# Patient Record
Sex: MALE | Race: WHITE | NOT HISPANIC OR LATINO
[De-identification: names, ages, dates, MRNs, and addresses within clinical notes are randomized per-mention and may not be internally consistent; named-entity substitution may affect disease eponyms.]

---

## 2021-01-13 ENCOUNTER — APPOINTMENT (OUTPATIENT)
Dept: ORTHOPEDIC SURGERY | Facility: CLINIC | Age: 81
End: 2021-01-13
Payer: MEDICARE

## 2021-01-13 DIAGNOSIS — Z86.39 PERSONAL HISTORY OF OTHER ENDOCRINE, NUTRITIONAL AND METABOLIC DISEASE: ICD-10-CM

## 2021-01-13 PROBLEM — Z00.00 ENCOUNTER FOR PREVENTIVE HEALTH EXAMINATION: Status: ACTIVE | Noted: 2021-01-13

## 2021-01-13 PROCEDURE — 99204 OFFICE O/P NEW MOD 45 MIN: CPT | Mod: 25

## 2021-01-13 PROCEDURE — 99072 ADDL SUPL MATRL&STAF TM PHE: CPT

## 2021-01-13 PROCEDURE — 73562 X-RAY EXAM OF KNEE 3: CPT | Mod: 50

## 2021-01-13 PROCEDURE — 20610 DRAIN/INJ JOINT/BURSA W/O US: CPT | Mod: RT

## 2021-01-13 NOTE — DISCUSSION/SUMMARY
[de-identified] : I discussed the underlying pathophysiology of the patient's condition in great detail with the patient. I went over the patient's x-rays with them in great detail. The extent of the patient’s arthritis was discussed in great detail with them. We discussed the use of ice, Tylenol and anti-inflammatories to relieve pain. Various treatment modalities including cortisone injections, viscosupplementation, and surgical intervention were discussed as solutions for the patient's symptoms. Patient understands he needs to consider knee replacement given he is losing motion and conservative measures are not providing enough relief. The proper pre and post operative procedures and expectations were discussed in extensive detail with the patient. We had a lengthy discussion about the patient's issues, and talked about the benefits and risks of the procedure. He is interested in scheduling surgery for March or April.\par The patient elected to have his right knee aspirated followed by a cortisone injection today, and tolerated it well. I instructed the patient on ROM exercises, and told them to take it easy. The use of ice and rest was reviewed with the patient. The patient may resume activities tomorrow. He understands that this injection will delay a right knee surgery by 3 months. All of his questions were answered. He understands and consents to the plan. 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 Based upon the patient's continued symptoms and failure to respond to conservative treatment I have recommended a total knee replacement for this patient. A long discussion took place with the patient describing what a total joint replacement is and what the procedure would entail. A total knee model, similar to the implant that will be used during the operation was utilized to demonstrate and to discuss the various bearing surfaces of the implants. The hospitalization and post-operative care and rehabilitation were also discussed. The use of perioperative antibiotics and DVT prophylaxis were discussed. The risk, benefits and alternatives to a surgical intervention were discussed at length with the patient. The patient was also advised of risks related to the medical comorbidities and elevated body mass index (BMI). A lengthy discussion took place to review the most common complications including but not limited to: deep vein thrombosis, pulmonary embolus, heart attack, stroke, infection, wound breakdown, numbness, damage to nerves, tendon, muscles, arteries or other blood vessels, death and other possible complications from anesthesia. The patient was told that we will take steps to minimize these risks by using sterile technique, antibiotics and DVT prophylaxis when appropriate and follow the patient postoperatively in the office setting to monitor progress. The possibility of recurrent pain, no improvement in pain and actual worsening of pain were also discussed with the patient.\par \par The discharge plan of care focused on the patient going home following surgery. I encouraged the patient to make the necessary arrangements to have someone stay with them when they are discharged home. Following discharge, a home care nurse will visit the patient. They will open your home care case and request home physical therapy services. Home physical therapy will commence following discharge provided it is appropriate and covered by his health insurance benefit plan.\par \par The risks, benefits and alternative treatment options of total joint replacement were reviewed with the patient at great length. All questions were answered to the satisfaction of the patient. The patient participated in an interactive discussion of the total knee replacement implant planned for their surgery with questions answered. The patient agreed with the treatment plan, and has decided to move forward with the elective total knee replacement as planned.\par \par CESAR PEPPER was seen face to face and needs a commode for bedside use as the patient has no ability to acces the bathroom in their home. The patient also requires a rolling walker as this is needed for activities of daily living within their home secondary to the diagnosis of osteoarthritis.

## 2021-01-13 NOTE — HISTORY OF PRESENT ILLNESS
[de-identified] : 81 year old male presents complaining of bilateral knee pain, right much worse than left. He has had pain and swelling for quite some time but it has worsened since Sunday. He is accompanied by his son-in-law who states that the patient was seen over the Summer and told that he has a Baker's cyst in his right knee. It was drained and it felt better. He feels the swelling has returned and it is very uncomfortable to walk on. He notes buckling. He reports a surgery for a left patella fracture in 1965. He has not had other forms of treatment for his knee pain.

## 2021-01-13 NOTE — PHYSICAL EXAM
[de-identified] : Constitutional\par o Appearance : well-nourished, well developed, alert, in no acute distress \par Head and Face\par o Head :\par ¦ Inspection : atraumatic, normocephalic\par o Face :\par ¦ Inspection : no visible rash or discoloration\par Respiratory\par o Respiratory Effort: breathing unlabored \par Neurologic\par o Mental Status Examination :\par ¦ Orientation : grossly oriented to person, place and time\par Psychiatric\par o Mood and Affect: mood normal, affect appropriate \par \par Right Lower Extremity\par o Buttock : no tenderness, swelling or deformities \par o Right Hip :\par ¦ Inspection/Palpation : no tenderness, swelling or deformities\par ¦ Range of Motion : full and painless in all planes, no crepitance\par ¦ Stability : joint stability intact\par ¦ Strength : extension, flexion, adduction, abduction, internal rotation and external rotation 5/5 \par \par o Right Knee \par ¦ Inspection/Palpation : severe medial compartment tenderness to palpation, mild swelling, mild warmth, varus deformity, palpable Baker's cyst\par ¦ Range of Motion :  degrees with severe pain on full flexion, no crepitance\par ¦ Stability : no valgus or varus instability present on provocative testing\par ¦ Strength : flexion and extension 5/5\par ¦ Tests and Signs : Anterior Drawer negative, Lachman negative, Jhonny's negative\par \par Left Lower Extremity\par o Buttock : no tenderness, swelling or deformities \par o Left Hip :\par ¦ Inspection/Palpation : no tenderness, no swelling or deformities\par ¦ Range of Motion : full and painless in all planes, no crepitance\par ¦ Stability : joint stability intact\par ¦ Strength : extension, flexion, adduction, abduction, internal rotation and external rotation 5/5\par \par o Left Knee :\par ¦ Inspection/Palpation : medial compartment tenderness to palpation, no swelling, varus deformity, well-healed transverse incision\par ¦ Range of Motion :  degrees with pain on full flexion, no crepitance\par ¦ Stability : no valgus or varus instability present on provocative testing\par ¦ Strength : flexion and extension 5/5\par ¦ Tests and Signs : Anterior Drawer negative, Lachman negative, Jhonny's negative\par \par Gait: severe varus thrust bilaterally with an antalgic gait, no significant extremity swelling or lymphedema\par \par o Peripheral Vascular System :\par ¦ Dorsalis Pedis Artery : pulse 2+ bilaterally\par ¦ Posterior Tibial Artery : pulse 2+ bilaterally \par \par Radiology Results:\par o Right Knee: Standing AP, lateral and tunnel views were obtained and revealed bone on bone in the medial compartment with severe patellofemoral arthritis and calcification of the femoral and popliteal arteries.\par o Left Knee: Standing AP, lateral and tunnel views were obtained and revealed bone on bone in the medial compartment with evidence of an old patellar fracture and a broken wire present, calcification of the femoral and popliteal arteries.\par \par o Under sterile preparation 50 cc’s of slightly cloudy fluid was aspirated from the right knee.\par o Right Knee injection : Indication- knee osteoarthritis, Anatomic location- right intra-articular joint space, Spray - area was sterilized with Betadine and alcohol and anesthetized with Ethyl Chloride , needle used-20G, Medications given- 5cc's lidocaine, 0.5cc's kenalog, 0.5 cc's dexamethasone, and patient tolerated it well.

## 2021-01-13 NOTE — CONSULT LETTER
[Dear  ___] : Dear  [unfilled], [Consult Letter:] : I had the pleasure of evaluating your patient, [unfilled]. [Please see my note below.] : Please see my note below. [Consult Closing:] : Thank you very much for allowing me to participate in the care of this patient.  If you have any questions, please do not hesitate to contact me. [Sincerely,] : Sincerely, [FreeTextEntry3] : Julian Lopez M.D.

## 2021-01-13 NOTE — ADDENDUM
[FreeTextEntry1] : I, Alex Tamayo, acted solely as a scribe for Dr. Julian Lopez on this date 01/13/2021.\par All medical record entries made by the Scribe were at my, Dr. Julian Lopez, direction and personally dictated by me on 01/13/2021. I have reviewed the chart and agree that the record accurately reflects my personal performance of the history, physical exam, assessment and plan. I have also personally directed, reviewed, and agreed with the chart.

## 2021-02-17 ENCOUNTER — APPOINTMENT (OUTPATIENT)
Dept: ORTHOPEDIC SURGERY | Facility: CLINIC | Age: 81
End: 2021-02-17
Payer: MEDICARE

## 2021-02-17 PROCEDURE — 99072 ADDL SUPL MATRL&STAF TM PHE: CPT

## 2021-02-17 PROCEDURE — 99214 OFFICE O/P EST MOD 30 MIN: CPT

## 2021-02-17 NOTE — DISCUSSION/SUMMARY
[de-identified] : I went over the pathophysiology of the patient's symptoms in great detail with the patient. I informed him that injections such as cortisone and Viscosupplementation are short term solutions, and that surgery will be required to provide them long term relief from their symptoms. Patient understands he needs to consider knee replacement given he is losing motion and conservative measures are not providing enough relief. The proper pre and post operative procedures and expectations were discussed in extensive detail with the patient. We had a lengthy discussion about the patient's issues, and talked about the benefits and risks of the procedure. I informed him that he can have surgery in mid April at the earliest due to his recent cortisone injection. All of his questions were answered. He understands and consents to the plan. 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 Based upon the patient's continued symptoms and failure to respond to conservative treatment I have recommended a total knee replacement for this patient. A long discussion took place with the patient describing what a total joint replacement is and what the procedure would entail. A total knee model, similar to the implant that will be used during the operation was utilized to demonstrate and to discuss the various bearing surfaces of the implants. The hospitalization and post-operative care and rehabilitation were also discussed. The use of perioperative antibiotics and DVT prophylaxis were discussed. The risk, benefits and alternatives to a surgical intervention were discussed at length with the patient. The patient was also advised of risks related to the medical comorbidities and elevated body mass index (BMI). A lengthy discussion took place to review the most common complications including but not limited to: deep vein thrombosis, pulmonary embolus, heart attack, stroke, infection, wound breakdown, numbness, damage to nerves, tendon, muscles, arteries or other blood vessels, death and other possible complications from anesthesia. The patient was told that we will take steps to minimize these risks by using sterile technique, antibiotics and DVT prophylaxis when appropriate and follow the patient postoperatively in the office setting to monitor progress. The possibility of recurrent pain, no improvement in pain and actual worsening of pain were also discussed with the patient.\par \par The discharge plan of care focused on the patient going home following surgery. I encouraged the patient to make the necessary arrangements to have someone stay with them when they are discharged home. Following discharge, a home care nurse will visit the patient. They will open your home care case and request home physical therapy services. Home physical therapy will commence following discharge provided it is appropriate and covered by his health insurance benefit plan.\par \par The risks, benefits and alternative treatment options of total joint replacement were reviewed with the patient at great length. All questions were answered to the satisfaction of the patient. The patient participated in an interactive discussion of the total knee replacement implant planned for their surgery with questions answered. The patient agreed with the treatment plan, and has decided to move forward with the elective total knee replacement as planned.\par \par CESAR PEPPER was seen face to face and needs a commode for bedside use as the patient has no ability to acces the bathroom in their home. The patient also requires a rolling walker as this is needed for activities of daily living within their home secondary to the diagnosis of osteoarthritis.

## 2021-02-17 NOTE — HISTORY OF PRESENT ILLNESS
[de-identified] : 81 year old male presents complaining of bilateral knee pain, right much worse than left. He reports a surgery for a left patellar fracture in 1965. He notes buckling. He had his right knee aspirated and received a cortisone injection on 1/20/2021. He notes mild pain relief but his swelling has returned. He understands that he has severe arthritis and needs to consider a right TKR. He is interested in scheduling surgery for March or April.\par \par Radiology Results done 1/13/2021:\par o Right Knee: Standing AP, lateral and tunnel views were obtained and revealed bone on bone in the medial compartment with severe patellofemoral arthritis and calcification of the femoral and popliteal arteries.\par o Left Knee: Standing AP, lateral and tunnel views were obtained and revealed bone on bone in the medial compartment with evidence of an old patellar fracture and a broken wire present, calcification of the femoral and popliteal arteries.

## 2021-02-17 NOTE — ADDENDUM
[FreeTextEntry1] : I, Alex Tamayo, acted solely as a scribe for Dr. Julian Lopez on this date 02/17/2021.\par All medical record entries made by the Scribe were at my, Dr. Julian Lopez, direction and personally dictated by me on 02/17/2021. I have reviewed the chart and agree that the record accurately reflects my personal performance of the history, physical exam, assessment and plan. I have also personally directed, reviewed, and agreed with the chart.

## 2021-02-17 NOTE — PHYSICAL EXAM
[de-identified] : Constitutional\par o Appearance : well-nourished, well developed, alert, in no acute distress \par Head and Face\par o Head :\par ¦ Inspection : atraumatic, normocephalic\par o Face :\par ¦ Inspection : no visible rash or discoloration\par Respiratory\par o Respiratory Effort: breathing unlabored \par Neurologic\par o Mental Status Examination :\par ¦ Orientation : grossly oriented to person, place and time\par Psychiatric\par o Mood and Affect: mood normal, affect appropriate \par \par Right Lower Extremity\par o Buttock : no tenderness, swelling or deformities \par o Right Hip :\par ¦ Inspection/Palpation : no tenderness, swelling or deformities\par ¦ Range of Motion : full and painless in all planes, no crepitance\par ¦ Stability : joint stability intact\par ¦ Strength : extension, flexion, adduction, abduction, internal rotation and external rotation 5/5 \par \par o Right Knee \par ¦ Inspection/Palpation : medial compartment tenderness to palpation, mild swelling, mild warmth, varus deformity\par ¦ Range of Motion : ° with pain on full flexion, no crepitance\par ¦ Stability : no valgus or varus instability present on provocative testing\par ¦ Strength : flexion and extension 5/5\par ¦ Tests and Signs : Anterior Drawer negative, Lachman negative, Jhonny's negative\par \par Left Lower Extremity\par o Buttock : no tenderness, swelling or deformities \par o Left Hip :\par ¦ Inspection/Palpation : no tenderness, no swelling or deformities\par ¦ Range of Motion : full and painless in all planes, no crepitance\par ¦ Stability : joint stability intact\par ¦ Strength : extension, flexion, adduction, abduction, internal rotation and external rotation 5/5\par \par o Left Knee :\par ¦ Inspection/Palpation : medial compartment tenderness to palpation, no swelling, varus deformity, well-healed transverse incision\par ¦ Range of Motion : ° with pain on full flexion, no crepitance\par ¦ Stability : no valgus or varus instability present on provocative testing\par ¦ Strength : flexion and extension 5/5\par ¦ Tests and Signs : Anterior Drawer negative, Lachman negative, Jhonny's negative\par \par Gait: severe varus thrust bilaterally, no significant extremity swelling or lymphedema\par \par o Peripheral Vascular System :\par ¦ Dorsalis Pedis Artery : pulse 2+ bilaterally\par ¦ Posterior Tibial Artery : pulse 2+ bilaterally

## 2021-03-01 ENCOUNTER — APPOINTMENT (OUTPATIENT)
Dept: ORTHOPEDIC SURGERY | Facility: CLINIC | Age: 81
End: 2021-03-01
Payer: MEDICARE

## 2021-03-01 DIAGNOSIS — M17.0 BILATERAL PRIMARY OSTEOARTHRITIS OF KNEE: ICD-10-CM

## 2021-03-01 PROCEDURE — 99072 ADDL SUPL MATRL&STAF TM PHE: CPT

## 2021-03-01 PROCEDURE — 99214 OFFICE O/P EST MOD 30 MIN: CPT

## 2021-03-01 RX ORDER — CELECOXIB 200 MG/1
200 CAPSULE ORAL
Qty: 30 | Refills: 3 | Status: ACTIVE | COMMUNITY
Start: 2021-03-01 | End: 1900-01-01

## 2021-03-01 NOTE — ADDENDUM
[FreeTextEntry1] : I, Alex Tamayo, acted solely as a scribe for Dr. Julian Lopez on this date 03/01/2021.\par All medical record entries made by the Scribe were at my, Dr. Julian Lopez, direction and personally dictated by me on 03/01/2021. I have reviewed the chart and agree that the record accurately reflects my personal performance of the history, physical exam, assessment and plan. I have also personally directed, reviewed, and agreed with the chart.

## 2021-03-01 NOTE — PHYSICAL EXAM
[de-identified] : Constitutional\par o Appearance : well-nourished, well developed, alert, in no acute distress \par Head and Face\par o Head :\par ¦ Inspection : atraumatic, normocephalic\par o Face :\par ¦ Inspection : no visible rash or discoloration\par Respiratory\par o Respiratory Effort: breathing unlabored \par Neurologic\par o Mental Status Examination :\par ¦ Orientation : grossly oriented to person, place and time\par Psychiatric\par o Mood and Affect: mood normal, affect appropriate \par \par Right Lower Extremity\par o Buttock : no tenderness, swelling or deformities \par o Right Hip :\par ¦ Inspection/Palpation : no tenderness, swelling or deformities\par ¦ Range of Motion : full and painless in all planes, no crepitance\par ¦ Stability : joint stability intact\par ¦ Strength : extension, flexion, adduction, abduction, internal rotation and external rotation 5/5 \par \par o Right Knee \par ¦ Inspection/Palpation : medial compartment tenderness to palpation, mild swelling, mild warmth, varus deformity\par ¦ Range of Motion : ° with pain on full flexion, no crepitance\par ¦ Stability : no valgus or varus instability present on provocative testing\par ¦ Strength : flexion and extension 5/5\par ¦ Tests and Signs : Anterior Drawer negative, Lachman negative, Jhonny's negative\par \par Left Lower Extremity\par o Buttock : no tenderness, swelling or deformities \par o Left Hip :\par ¦ Inspection/Palpation : no tenderness, no swelling or deformities\par ¦ Range of Motion : full and painless in all planes, no crepitance\par ¦ Stability : joint stability intact\par ¦ Strength : extension, flexion, adduction, abduction, internal rotation and external rotation 5/5\par \par o Left Knee :\par ¦ Inspection/Palpation : medial and lateral compartment tenderness to palpation, mild swelling, mild warmth, varus deformity, well-healed transverse incision\par ¦ Range of Motion : 5-110° with pain on full flexion, no crepitance\par ¦ Stability : no valgus or varus instability present on provocative testing\par ¦ Strength : flexion and extension 5/5\par ¦ Tests and Signs : Anterior Drawer negative, Lachman negative, Jhonny's negative\par \par o Peripheral Vascular System :\par ¦ Dorsalis Pedis Artery : pulse 2+ bilaterally\par ¦ Posterior Tibial Artery : pulse 2+ bilaterally \par \par Gait: severely antalgic gait with a cane in the left hand, severe varus thrust bilaterally, no significant extremity swelling or lymphedema

## 2021-03-01 NOTE — HISTORY OF PRESENT ILLNESS
[de-identified] : 81 year old male presents complaining of bilateral knee pain, right much worse than left. He reports a surgery for a left patellar fracture in 1965. He notes buckling. He had his right knee aspirated and received a cortisone injection on 1/13/2021. He notes mild pain relief but his swelling has returned. He is complaining of pain about the thigh and lower leg, as well. He occasionally takes Tylenol or Advil. He understands that he has severe arthritis and is ready to proceed with a right total knee replacement. He will schedule surgery for March or April.\par \par Radiology Results done 1/13/2021:\par o Right Knee: Standing AP, lateral and tunnel views were obtained and revealed bone on bone in the medial compartment with severe patellofemoral arthritis and calcification of the femoral and popliteal arteries.\par o Left Knee: Standing AP, lateral and tunnel views were obtained and revealed bone on bone in the medial compartment with evidence of an old patellar fracture and a broken wire present, calcification of the femoral and popliteal arteries.

## 2021-03-01 NOTE — DISCUSSION/SUMMARY
[de-identified] : I went over the pathophysiology of the patient's symptoms in great detail with the patient. We discussed the use of ice, Tylenol and anti-inflammatories to relieve pain. A prescription for Celebrex was provided. I informed him that injections such as cortisone and Viscosupplementation are short term solutions, and that surgery will be required to provide them long term relief from their symptoms. Patient understands he needs to consider knee replacement given he is losing motion and conservative measures are not providing enough relief. The proper pre and post operative procedures and expectations were discussed in extensive detail with the patient. We had a lengthy discussion about the patient's issues, and talked about the benefits and risks of the procedure. I informed him that he can have surgery in mid April at the earliest due to his recent cortisone injection. All of his questions were answered. 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 Based upon the patient's continued symptoms and failure to respond to conservative treatment I have recommended a total knee replacement for this patient. A long discussion took place with the patient describing what a total joint replacement is and what the procedure would entail. A total knee model, similar to the implant that will be used during the operation was utilized to demonstrate and to discuss the various bearing surfaces of the implants. The hospitalization and post-operative care and rehabilitation were also discussed. The use of perioperative antibiotics and DVT prophylaxis were discussed. The risk, benefits and alternatives to a surgical intervention were discussed at length with the patient. The patient was also advised of risks related to the medical comorbidities and elevated body mass index (BMI). A lengthy discussion took place to review the most common complications including but not limited to: deep vein thrombosis, pulmonary embolus, heart attack, stroke, infection, wound breakdown, numbness, damage to nerves, tendon, muscles, arteries or other blood vessels, death and other possible complications from anesthesia. The patient was told that we will take steps to minimize these risks by using sterile technique, antibiotics and DVT prophylaxis when appropriate and follow the patient postoperatively in the office setting to monitor progress. The possibility of recurrent pain, no improvement in pain and actual worsening of pain were also discussed with the patient.\par \par The discharge plan of care focused on the patient going home following surgery. I encouraged the patient to make the necessary arrangements to have someone stay with them when they are discharged home. Following discharge, a home care nurse will visit the patient. They will open your home care case and request home physical therapy services. Home physical therapy will commence following discharge provided it is appropriate and covered by his health insurance benefit plan.\par \par The risks, benefits and alternative treatment options of total joint replacement were reviewed with the patient at great length. All questions were answered to the satisfaction of the patient. The patient participated in an interactive discussion of the total knee replacement implant planned for their surgery with questions answered. The patient agreed with the treatment plan, and has decided to move forward with the elective total knee replacement as planned.\par \par CESAR PEPPER was seen face to face and needs a commode for bedside use as the patient has no ability to acces the bathroom in their home. The patient also requires a rolling walker as this is needed for activities of daily living within their home secondary to the diagnosis of osteoarthritis.

## 2021-05-05 ENCOUNTER — APPOINTMENT (OUTPATIENT)
Dept: ORTHOPEDIC SURGERY | Facility: CLINIC | Age: 81
End: 2021-05-05

## 2021-05-11 ENCOUNTER — APPOINTMENT (OUTPATIENT)
Dept: ORTHOPEDIC SURGERY | Facility: HOSPITAL | Age: 81
End: 2021-05-11

## 2021-05-26 ENCOUNTER — APPOINTMENT (OUTPATIENT)
Dept: ORTHOPEDIC SURGERY | Facility: CLINIC | Age: 81
End: 2021-05-26

## 2024-03-07 ENCOUNTER — APPOINTMENT (OUTPATIENT)
Dept: ORTHOPEDIC SURGERY | Facility: CLINIC | Age: 84
End: 2024-03-07
Payer: MEDICARE

## 2024-03-07 VITALS — WEIGHT: 120 LBS | BODY MASS INDEX: 21.26 KG/M2 | HEIGHT: 63 IN

## 2024-03-07 PROCEDURE — 20610 DRAIN/INJ JOINT/BURSA W/O US: CPT | Mod: LT

## 2024-03-07 PROCEDURE — 99203 OFFICE O/P NEW LOW 30 MIN: CPT | Mod: 25

## 2024-03-07 RX ORDER — CELECOXIB 200 MG/1
200 CAPSULE ORAL
Qty: 30 | Refills: 0 | Status: ACTIVE | COMMUNITY
Start: 2024-03-07 | End: 1900-01-01

## 2024-03-07 NOTE — REASON FOR VISIT
[Initial Visit] : an initial visit for [Knee Pain] : knee pain [FreeTextEntry2] : HAVING RIGHT KNEE PAIN AND SWELLING

## 2024-03-07 NOTE — PHYSICAL EXAM
[de-identified] : Left knee on exam today definite small to moderate-sized effusion range of motion is 8 to 105 degrees.  The knee is stable to AP stress varus valgus stress in full extension and 90 degrees of flexion neurovascular intact distally.

## 2024-03-07 NOTE — PROCEDURE
[de-identified] : [facility] [id number] LOT # [id number] EXP  07/25 1% 500MG/50ML  KENALOG-10 [employer] NDC 6371-6484-84 LOT # [id number] EXP 11/25 50MG/5ML  Patient had a left knee aspirated yielding approximate 20 cc of clear straw-colored fluid.  Through the same needle he was given a cortisone injection.  Patient tolerated the injection well and felt immediate improvement.

## 2024-03-07 NOTE — HISTORY OF PRESENT ILLNESS
[de-identified] : First-time visit for this 84-year-old gentleman is here with a 2-week history of swelling and pain in the left knee.  Recall of he is status post right knee replacement by another physician.  Patient states he did cause no specific accident injury but noticed swelling and pain discomfort going up and down stairs.  He is here to have it evaluated.

## 2024-03-07 NOTE — DISCUSSION/SUMMARY
[de-identified] : Patient I talked at length about the underlying etiology of his left knee pain and swelling.  He probably has fairly advanced osteoarthritis of the left knee.  Patient was given a cortisone injection and have the knee aspirated today he will be placed on Celebrex 20 mg p.o. twice daily for 3-day course and to be taken thereafter as needed.  Reimbursements medication discussed in detail including potential side effects.  Reviewed current medication profile and appears to be no relative contraindication of its current limited use.  Patient also ice the knee daily for the next 3 to 4 days he will follow-up next Tuesday for repeat evaluation with radiographs if not thoroughly improved.  This consultation lasted 40 minutes.

## 2024-03-14 ENCOUNTER — APPOINTMENT (OUTPATIENT)
Dept: ORTHOPEDIC SURGERY | Facility: CLINIC | Age: 84
End: 2024-03-14

## 2024-04-18 ENCOUNTER — APPOINTMENT (OUTPATIENT)
Dept: ORTHOPEDIC SURGERY | Facility: CLINIC | Age: 84
End: 2024-04-18
Payer: MEDICARE

## 2024-04-18 DIAGNOSIS — M17.12 UNILATERAL PRIMARY OSTEOARTHRITIS, LEFT KNEE: ICD-10-CM

## 2024-04-18 PROCEDURE — 20610 DRAIN/INJ JOINT/BURSA W/O US: CPT | Mod: LT

## 2024-04-18 PROCEDURE — 99215 OFFICE O/P EST HI 40 MIN: CPT | Mod: 25

## 2024-04-18 NOTE — PHYSICAL EXAM
[de-identified] : Left knee on exam today definite small to moderate-sized effusion range of motion is 8 to 105 degrees.  The knee is stable to AP stress varus valgus stress in full extension and 90 degrees of flexion neurovascular intact distally.

## 2024-04-18 NOTE — PROCEDURE
[de-identified] : [facility] [id number] LOT # [id number] EXP  7/25 1% 500MG/50ML  KENALOG-10 [employer] NDC 3047-4229-73 LOT # [id number] EXP 11/25 50MG/5ML  Patient had a left knee aspirated yielding approximate 40 cc of clear straw-colored fluid.  He was given a cortisone injection to the same needle into the affected left knee.  Patient tolerated injection well.

## 2024-04-18 NOTE — REASON FOR VISIT
[Follow-Up Visit] : a follow-up visit for [Knee Pain] : knee pain [FreeTextEntry2] : LEFT KNEE PAIN.

## 2024-04-18 NOTE — HISTORY OF PRESENT ILLNESS
[de-identified] : Today here well-known patient returns today with resumption of left knee swelling and pain.  He was seen in March 7 of this year he had the knee aspirated but now is complaining of resumption of swelling.  Recall he is status post right knee replacement by another physician.  Patient does not recall a specific accident injury or initiating traumatic event but is noticed this level of swelling for about 10 days.

## 2024-04-18 NOTE — DISCUSSION/SUMMARY
[de-identified] : In addition to today's aspiration cortisone injection patient will be sent today for radiographs to help evaluate the status of his left knee.  Due to the fact that he been having recurrent effusions I believe he may have a fairly advanced arthritic condition of the left knee that we should need to address.  Patient will be notified of the findings of the x-ray once they are available for review.  Today's consultation including the aspiration and injection lasted 45 minutes.

## 2024-04-22 RX ORDER — HYALURONATE SODIUM, STABILIZED 88 MG/4 ML
88 SYRINGE (ML) INTRAARTICULAR
Qty: 1 | Refills: 0 | Status: ACTIVE | OUTPATIENT
Start: 2024-04-22

## 2024-08-13 ENCOUNTER — APPOINTMENT (OUTPATIENT)
Dept: ORTHOPEDIC SURGERY | Facility: CLINIC | Age: 84
End: 2024-08-13

## 2024-09-12 ENCOUNTER — APPOINTMENT (OUTPATIENT)
Dept: ORTHOPEDIC SURGERY | Facility: CLINIC | Age: 84
End: 2024-09-12

## 2024-09-17 ENCOUNTER — APPOINTMENT (OUTPATIENT)
Dept: ORTHOPEDIC SURGERY | Facility: CLINIC | Age: 84
End: 2024-09-17

## 2024-09-17 DIAGNOSIS — M17.12 UNILATERAL PRIMARY OSTEOARTHRITIS, LEFT KNEE: ICD-10-CM

## 2024-09-17 PROCEDURE — 99214 OFFICE O/P EST MOD 30 MIN: CPT | Mod: 25

## 2024-09-17 PROCEDURE — 20610 DRAIN/INJ JOINT/BURSA W/O US: CPT | Mod: LT

## 2024-09-17 RX ORDER — METHYLPREDNISOLONE 4 MG/1
4 TABLET ORAL
Qty: 1 | Refills: 1 | Status: ACTIVE | COMMUNITY
Start: 2024-09-17 | End: 1900-01-01

## 2024-09-17 NOTE — PHYSICAL EXAM
[de-identified] : Left knee on exam today definite small to moderate-sized effusion range of motion is 8 to 105 degrees.  The knee is stable to AP stress varus valgus stress in full extension and 90 degrees of flexion neurovascular intact distally.

## 2024-09-17 NOTE — HISTORY OF PRESENT ILLNESS
[de-identified] : Established patient returns today with resumption of left knee pain.  Patient was seen most recently April 2024 at that point he had left knee aspirated and a cortisone injection applied at the same setting.  This afforded him significant long-term relief from his diagnosis of moderate advanced left knee osteoarthritis.  The diagnosis of the arthritis was verified on recent radiographs as well.  Patient returns with resumption of left knee pain especially going up and down stairs the pain symptoms worsening and continues to prevent him from his activities of daily living as well as from having restful sleep.

## 2024-09-17 NOTE — PROCEDURE
[de-identified] [facility] [id number] LOT # [id number] EXP 2025/11 1% 500MG/50ML   KENALOG-10 [employer] NDC 4790-1531-66 LOT # [id number] EXP 2/26 50MG/5ML  Patient given cortisone injection lateral part of left knee today.  This done in sterile conditions.  Patient tolerated injection well.

## 2024-09-17 NOTE — DISCUSSION/SUMMARY
[de-identified] : Patient I talked about her options in addition to today's cortisone injection patient will have the ability to ice the knee when symptoms merit.  Patient has had fairly significant symptoms if he does not see sustained symptomatic improvement with cortisone injection will be prescribed a Medrol Dosepak.  Reasonable risk and benefits of medication discussed in detail including potential side effects.  Also reviewed patient's current medication profile appears to be no relative current contraindication to his limited use.  Patient will follow-up in the office in 7 days to 2 weeks time if not thoroughly improved at that point may consider repeat injection for possible repeat radiographs with the intention of potentially recommending knee replacement surgery due to the advanced radiographic findings and lack of improvement with conservative measures.  This consultation lasted 35 minutes.